# Patient Record
Sex: MALE | Race: WHITE | NOT HISPANIC OR LATINO | URBAN - METROPOLITAN AREA
[De-identification: names, ages, dates, MRNs, and addresses within clinical notes are randomized per-mention and may not be internally consistent; named-entity substitution may affect disease eponyms.]

---

## 2017-01-24 ENCOUNTER — OFFICE VISIT - RIVER FALLS (OUTPATIENT)
Dept: FAMILY MEDICINE | Facility: CLINIC | Age: 82
End: 2017-01-24

## 2017-01-24 ASSESSMENT — MIFFLIN-ST. JEOR: SCORE: 1589.77

## 2017-08-30 ENCOUNTER — OFFICE VISIT - RIVER FALLS (OUTPATIENT)
Dept: FAMILY MEDICINE | Facility: CLINIC | Age: 82
End: 2017-08-30

## 2017-08-30 ASSESSMENT — MIFFLIN-ST. JEOR: SCORE: 1551.67

## 2017-08-31 LAB
CHOLEST SERPL-MCNC: 148 MG/DL
CHOLEST/HDLC SERPL: 2.5 {RATIO}
CREAT SERPL-MCNC: 1.16 MG/DL (ref 0.7–1.11)
GLUCOSE BLD-MCNC: 95 MG/DL (ref 65–99)
HDLC SERPL-MCNC: 59 MG/DL
LDLC SERPL CALC-MCNC: 70 MG/DL
NONHDLC SERPL-MCNC: 89 MG/DL
TRIGL SERPL-MCNC: 102 MG/DL

## 2018-01-01 ENCOUNTER — OFFICE VISIT - RIVER FALLS (OUTPATIENT)
Dept: FAMILY MEDICINE | Facility: CLINIC | Age: 83
End: 2018-01-01

## 2018-01-01 ENCOUNTER — COMMUNICATION - RIVER FALLS (OUTPATIENT)
Dept: FAMILY MEDICINE | Facility: CLINIC | Age: 83
End: 2018-01-01

## 2018-01-01 ASSESSMENT — MIFFLIN-ST. JEOR: SCORE: 1540.78

## 2018-02-08 ENCOUNTER — OFFICE VISIT - RIVER FALLS (OUTPATIENT)
Dept: FAMILY MEDICINE | Facility: CLINIC | Age: 83
End: 2018-02-08

## 2018-02-09 LAB
CREAT SERPL-MCNC: 1.51 MG/DL (ref 0.7–1.11)
GLUCOSE BLD-MCNC: 93 MG/DL (ref 65–99)

## 2018-04-02 ENCOUNTER — AMBULATORY - RIVER FALLS (OUTPATIENT)
Dept: FAMILY MEDICINE | Facility: CLINIC | Age: 83
End: 2018-04-02

## 2018-04-03 LAB
CHOLEST SERPL-MCNC: 131 MG/DL
CHOLEST/HDLC SERPL: 2.1 {RATIO}
CREAT SERPL-MCNC: 1.4 MG/DL (ref 0.7–1.11)
GLUCOSE BLD-MCNC: 76 MG/DL (ref 65–99)
HDLC SERPL-MCNC: 61 MG/DL
LDLC SERPL CALC-MCNC: 56 MG/DL
NONHDLC SERPL-MCNC: 70 MG/DL
PSA SERPL-MCNC: 0.3 NG/ML
TRIGL SERPL-MCNC: 65 MG/DL

## 2019-01-01 ENCOUNTER — OFFICE VISIT - RIVER FALLS (OUTPATIENT)
Dept: FAMILY MEDICINE | Facility: CLINIC | Age: 84
End: 2019-01-01

## 2019-01-01 LAB
CREAT SERPL-MCNC: 1.13 MG/DL
CREAT SERPL-MCNC: 1.24 MG/DL
HGB BLD-MCNC: 10.3 G/DL
HGB BLD-MCNC: 10.9 G/DL
POTASSIUM BLD-SCNC: 3.7 MEQ/L
POTASSIUM BLD-SCNC: 4 MEQ/L
SODIUM SERPL-SCNC: 140 MEQ/L
SODIUM SERPL-SCNC: 141 MEQ/L
WBC # BLD AUTO: 6.6 X10
WBC # BLD AUTO: 7 X10

## 2022-02-12 VITALS
BODY MASS INDEX: 29.59 KG/M2 | HEIGHT: 69 IN | SYSTOLIC BLOOD PRESSURE: 144 MMHG | WEIGHT: 199.8 LBS | HEART RATE: 64 BPM | DIASTOLIC BLOOD PRESSURE: 76 MMHG

## 2022-02-12 VITALS
WEIGHT: 197.4 LBS | SYSTOLIC BLOOD PRESSURE: 158 MMHG | HEIGHT: 69 IN | BODY MASS INDEX: 29.24 KG/M2 | HEART RATE: 59 BPM | DIASTOLIC BLOOD PRESSURE: 62 MMHG | OXYGEN SATURATION: 97 %

## 2022-02-12 VITALS
OXYGEN SATURATION: 98 % | DIASTOLIC BLOOD PRESSURE: 62 MMHG | HEART RATE: 56 BPM | SYSTOLIC BLOOD PRESSURE: 138 MMHG | TEMPERATURE: 97.9 F

## 2022-02-12 VITALS
DIASTOLIC BLOOD PRESSURE: 62 MMHG | SYSTOLIC BLOOD PRESSURE: 124 MMHG | BODY MASS INDEX: 30.84 KG/M2 | WEIGHT: 208.2 LBS | HEIGHT: 69 IN | HEART RATE: 58 BPM

## 2022-02-15 NOTE — PROGRESS NOTES
GABBY WATERMAN  : 1931  10/10/2018  MRN: [id number]  [facility] Visit  The patient has three issues.  PROBLEM #1  S: Severe anxiety.  He has been on alprazolam t.i.d. for quite some time.  Recently sertraline 25 mg daily was added.  This seems to have been partially effective and staff wonders if it can be increased in strength.  The patient reports not significant side effects.  P: We will increase the sertraline to 50 mg daily.  PROBLEM #2  S: Arthritis.  A couple of weeks ago we started him on some tramadol for his arthritis and this has been very effective in decreasing his pain.  However, he has noticed a change in his stool habits, which is quite disturbing since he has a colostomy bag and his colostomy irrigation habits have changed significantly since he started the tramadol.  He wants to discontinue the tramadol in hopes that his bowel habits will return to previous.  P: His tramadol is discontinued and in place of it I am going to try Celebrex 100 mg daily.  He does have a slightly elevated creatinine so we will need to be cognizant of that and monitor that.  PROBLEM #3  S: Colostomy.  His is concerned that he is having to irrigate every day and it takes two to three hours every day to achieve this.  If his bowel habits don t return to normal with the medication changes we may need to consider an alternate method of managing his colostomy.  A colostomy specialist might be consulted for this purpose if it becomes necessary.  P: I will be following up with the patient again next week to find out what affect our changes have had.  D: 10/10/2018  T: 10/11/2018  Jay Jay Vasques MD/ann

## 2022-02-15 NOTE — TELEPHONE ENCOUNTER
---------------------  From: Wendy De La Torre CMA   To: Kerri Poe;     Sent: 2019 9:39:12 AM CST  Subject: alprazolam refill     Pt at [facility] that [facility] sees when he does [facility] rounds    PCP:   [facility]    Medication:   alprazolam not currently listed on his med list but I do know that it is listed on his recent hospital discharge dated 19 as a new dose  Last Filled:  _    Quantity: 60    Refills:  _---------------------  From: Kerri Poe   To: Wendy De La Torre CMA;     Sent: 2019 10:51:47 AM CST  Subject: RE: alprazolam refill     I sent a small tapering prescription to  after reviewing the epdmp which indicated a 5 day supply was given 2019 by DR Behzad Garcia.  If this is a routine med, Dr Vasques can refill for longer.

## 2022-02-15 NOTE — PROGRESS NOTES
GABBY WATERMANWyatt : 1931 MRN: [id number]  [facility] Visit  S: The resident has a current urinary tract infection as evidenced by urinalysis.  His anxiety seems worse.  His cognitive function seems to be declining.    O: Blood pressure is 162/72.  Pulse is 80.   Weight is 183.     He is mildly confused but alert and cooperative.  Skin is normal color, temperature, and non-diaphoretic.  Lungs are clear.  Heart is regular rate and rhythm without murmur.  No peripheral cyanosis or edema.  A: 1.  Urinary tract infection.    2.  Increased anxiety.   P: Cipro 250 mg b.i.d. for 7 days.  His Ativan is currently p.r.n. and we are going to schedule it t.i.d.   D:  2019  T:  2019 Jay Jay Vasques MD/kinga    c:  [facility]

## 2022-02-15 NOTE — CARE COORDINATION
Pt appears on [facility] chronic disease panel as out of parameters for HTN.  Pt is in  care center and provider seeing on rounds, pt has dementia and will be in care center permanently.  No further contact will be made.

## 2022-02-15 NOTE — PROGRESS NOTES
RADHAGABBY VIRKWyatt :   1931 MRN:   [id number]  [facility] Visit  S: The resident and his family had questioned whether his Noriega catheter needed to come out.  I reviewed the notes from his recent hospitalization and it was recommended that the catheter remain in place until he sees Urology.  He is not having any significant problems with the catheter other than the discomfort associated with prolonged catheterization.    A: 1.  Urinary tract infection.    2.  Urinary retention.     3.  Benign prostatic hypertrophy.    P: The catheter will remain in place.  He is in the process of being scheduled to see Urology ini the near future.     D:  2019  T:  2019 Jay Jay Vasques MD/kinga    c:  [facility]

## 2022-02-15 NOTE — PROGRESS NOTES
COLUMBA GABBY THADDEUS : 1931  [facility] Visit MRN: [id number]  2018  S:  The patient asked me to come out and see him regarding his colostomy.  His previous method of dealing with his colostomy was to irrigate every other evening.  He would now like to switch to a method where he has a larger bag and does not need to irrigate.  He has already tried the larger bag but thinks his stool is too thick to work adequately.  Our plan is to put him on MiraLAX 17 grams daily or b.i.d. to loosen his stool or possibly even liquify his stool so he can use the new bag.     I did call in a prescription for the new bags to [facility] in [place] at [phone].  His new bag is called the Credport 8331 with 38 millimeter opening.  Once he gets the new bags we will get him started on MiraLAX and then will monitor how things go.    A: 1.  History of colon cancer.    2.  History of hemicolectomy.    3.  History of colostomy.   P: As above.    D:  2018  T:  2018                                                Jay Jay Vasques MD/kinga

## 2022-02-15 NOTE — TELEPHONE ENCOUNTER
---------------------  From: Wendy De La Torre CMA (eRx Pool ([id number]   To: Jay Jay Vasques MD;     Sent: 1/2/2019 8:03:13 AM CST  Subject: FW: Medication Management   Due Date/Time: 12/29/2018 2:26:00 PM CST             ** Patient matched by Wendy De La Torre CMA on 1/2/2019 8:03:03 AM CST **      ------------------------------------------  From: [facility]  To: Jay Jay Vasques MD  Sent: December 28, 2018 2:26:31 PM CST  Subject: Medication Management  Due: December 29, 2018 2:26:31 PM CST    ** On Hold Pending Signature **  Drug: ALPRAZolam (ALPRAZolam 0.25 mg oral tablet)  Take one (1) tablet orally (by mouth) twice daily and as needed --newdose  Quantity: 60 EA       Days Supply: 0         Refills: 0  Substitutions Allowed  Notes from Pharmacy:     Dispensed Drug: ALPRAZolam (ALPRAZolam 0.25 mg oral tablet)  Take one (1) tablet orally (by mouth) twice daily and as needed --newdose  Quantity: 60 EA       Days Supply: 0         Refills: 0  Substitutions Allowed  Notes from Pharmacy:   ------------------------------------------

## 2022-02-15 NOTE — PROGRESS NOTES
Patient:   GABBY WATERMAN            MRN: [id number]            FIN: [id number]               Age:   87 years     Sex:  Male     :  1/3/1931   Associated Diagnoses:   Edema of both ankles   Author:   Jay Jay Vasques MD      Impression and Plan      Diagnosis   Edema of both ankles (ZIM56-KK M25.471)   Course:  Worsening.    Plan:  take Furosemide 20 mg on days when weight is 205 lb. or greater.    Orders     Orders   Charges (Evaluation and Management):  94254 office outpatient visit 15 minutes (Charge) (Order): Quantity: 1, Edema of both ankles.     Orders (Selected)   Outpatient Orders  Order  Basic Metabolic Panel* ([facility]): Specimen Type: Serum, Collection Date: 2018 12:30 PM CST.        Visit Information      Date of Service: 2018 11:05 am  Performing Location: [facility]  Encounter#: [id number]      Primary Care Provider (PCP):  Jay Jay Vasques MD    NPI# [id number]   Visit type:  New symptom.    Accompanied by:  Family member.    Source of history:  Self, Family member.    History limitation:  None.       Chief Complaint   2018 11:32 AM CST    Pt here for increased weakness and not producing a lot of urine        History of Present Illness             The patient presents with peripheral edema.  The location of peripheral edema is bilateral.  The severity of the peripheral edema is mild.  The peripheral edema is episodic.  The peripheral edema has lasted for an unknown duration of time.  There are no modifying factors.  Associated symptoms consist of none.        Review of Systems   Constitutional:  Negative.    Eye:  Negative.    Ear/Nose/Mouth/Throat:  Negative.    Respiratory:  Negative.    Cardiovascular:  Negative.    Gastrointestinal:  Negative.    Genitourinary:  Negative.    Hematology/Lymphatics:  Negative.    Endocrine:  Negative.    Immunologic:  Negative.    Musculoskeletal:  Negative.    Integumentary:  Negative.    Neurologic:  Negative.    Psychiatric:  Negative.           All other systems reviewed and negative      Health Status   Allergies:    Allergic Reactions (Selected)  Severity Not Documented  Azithromycin (Constipation and constipation)  Citalopram (Gi distress)  HydrOXYzine (Alters mental staus)  Oxycodone (Alters mental status)  Penicillin (No reactions were documented)  Nonallergic Reactions (Selected)  Moderate  Sertraline (Dizziness)   Medications:  (Selected)   Prescriptions  Prescribed  ALPRAZolam 0.25 mg oral tablet: See Instructions, Instructions: 1 tab(s) PO QAM and 1 tabs PO QPM, PRN: for anxiety, # 60 tab(s), 5 Refill(s), Type: Maintenance, Pharmacy: [facility], 1 tab(s) PO QAM and 1 tabs PO QPM,PRN:for anxiety  ASSURA EASICLOSE DRAIN POUCH [id number]: ASSURA EASICLOSE DRAIN POUCH 87684, See Instructions, Instructions: AS DIRECTED, Supply, # 20 EA, 11 Refill(s), Type: Maintenance  Assura Easiclose Drain pouch (93148): Assura Easiclose Drain pouch (79710), See Instructions, Instructions: as directed, Supply, # 1 box(es), 5 Refill(s), Type: Maintenance, Pharmacy: [facility], as directed  C-pap mask, tubing and supplies: C-pap mask, tubing and supplies, See Instructions, Instructions: Please supply pt with  C-pap machine and  supplies such as mask, tubing, filters from [facility], Supply, # 1 EA, 0 Refill(s), Type: Maintenance  Irrigation Sleeve #7728: Irrigation Sleeve #7728, See Instructions, Instructions: as directed, Supply, # 20 EA, 5 Refill(s), Type: Maintenance, called to pharmacy (Rx)  amLODIPine 5 mg oral tablet: 1 tab(s) ( 5 mg ), po, daily, # 30 tab(s), 5 Refill(s), Type: Maintenance, Pharmacy: [facility], 1 tab(s) po daily  aspirin 81 mg oral tablet: 1 tab(s) ( 81 mg ), po, daily, # 30 tab(s), 0 Refill(s), Type: Maintenance  furosemide 20 mg oral tablet: 1 tab(s) ( 20 mg ), PO, Daily, PRN: Other (see comment), # 15 tab(s), 0 Refill(s), Type: Maintenance, Pharmacy: [facility], 1 tab(s) po daily,PRN:Other  (see comment)  omeprazole 20 mg oral delayed release capsule: 1 cap(s) ( 20 mg ), po, daily, # 30 cap(s), 5 Refill(s), Type: Maintenance, Pharmacy: [facility], Pt due for appt for refills, 1 cap(s) po daily  simvastatin 20 mg oral tablet: 1 tab(s) ( 20 mg ), po, hs, # 30 tab(s), 5 Refill(s), Type: Maintenance, Pharmacy: [facility], 1 tab(s) po hs  Documented Medications  Documented  Glucosamine & Chondroitin with MSM: po, tid, tab(s), 0 Refill(s), Type: Maintenance  PreserVision oral tablet: 1 tab(s), po, daily, 0 Refill(s), Type: Maintenance  loratadine 10 mg oral tablet: 1 tab(s) ( 10 mg ), po, daily, 0 Refill(s), Type: Maintenance   Problem list:    All Problems (Selected)  Allergic Rhinitis / ICD-9-.9 / Confirmed  Anxiety / SNOMED CT 91530169 / Confirmed  BPH (Benign Prostatic Hypertrophy) / ICD-9-.00 / Confirmed  Cancer of colon / SNOMED CT 558061624 / Confirmed  Colostomy / ICD-9-CM 46.1 / Confirmed  Depression, major, recurrent, moderate / ICD-9-.32 / Confirmed  KEVIN (generalized anxiety disorder) / SNOMED CT 51340090 / Confirmed  GERD (Gastroesophageal Reflux Disease) / ICD-9-.81 / Confirmed  Hiatal hernia / SNOMED CT 886247140 / Confirmed  High Cholesterol / ICD-9-.0 / Confirmed  Hypertension / SNOMED CT 8023229063 / Confirmed  Obesity / ICD-9-.00 / Confirmed  LUDMILA (Obstructive Sleep Apnea) / ICD-9-.23 / Confirmed  LUDMILA (obstructive sleep apnea) / SNOMED CT 591155126 / Confirmed  Osteoarthritis of Left Knee / ICD-9-.96 / Confirmed  Overactive bladder / SNOMED CT 008847452 / Confirmed      Histories   Past Medical History:    Active  Cancer of colon (408402763)  Hypertension (3560997275)  BPH (Benign Prostatic Hypertrophy) (600.00)  LUDMILA (Obstructive Sleep Apnea) (327.23)  Overactive bladder (370597489)  Colostomy (46.1)  Allergic Rhinitis (477.9)  Obesity (278.00)  Hiatal hernia (579356541)  High Cholesterol (272.0)  Resolved  *Hospitalized@[facility] -  BBPV: Onset on 10/12/2014 at 83 years.  Resolved on 10/15/2014 at 83 years.  *Hospitalized@[facility] - Urinary tract infection: Onset on 2014 at 83 years.  Resolved on 2014 at 83 years.  *Hospitalized@[facility] - Uretheral obstruction & UTI: Onset on 2013 at 82 years.  Resolved on 2013 at 82 years.   Family History:    Cancer  Sister ()  Hypertension  Mother ()     Procedure history:    Transurethral resection of prostate (SNOMED CT 073322226) in  at 83 Years.  Comments:  7/15/2014 11:01 AM - Jay Jay Vasques MD, Dr.  [facility]  Esophagogastroduodenoscopy (SNOMED CT 076614745) performed by Micheal Garza MD on 5/15/2012 at 81 Years.  Comments:  2/15/2013 2:54 PM - Katie Crum RN  Duodenal bx;  active chronic nonspecific duodenitis; no histologic evidence of celiac disease.  Stomach bx;  normal fundic mucosa, no evidence of gastritis, intestinal metaplasia or atrophy.  Colonoscopy (SNOMED CT 206106459) performed by Demetrio Bartlett MD on 3/28/2011 at 80 Years.  Comments:  5/3/2012 10:21 AM - Eleazar Fournier MA  Normal colonoscopy, follow up prn  Colonoscopy (SNOMED CT 595558215) performed by Demetrio Bartlett MD on 3/14/2006 at 75 Years.  Comments:  2010 3:48 PM - Khan , Kim  Polypectomy  Blepharoplasty (SNOMED CT 946102635) in  at 73 Years.  Colonoscopy (SNOMED CT 535963149) in the month of 2001 at 70 Years.  Arthroscopy of shoulder (SNOMED CT 932206469) in the month of 2000 at 69 Years.  Comments:  2010 3:47 PM - Khan , Kim  Left  Cataract extraction (SNOMED CT 94060795) in the month of 1999 at 68 Years.  Comments:  2010 3:46 PM - Khan , Kim  os  Cataract extraction (SNOMED CT 01098674) in the month of 10/1999 at 68 Years.  Comments:  2010 3:46 PM - Khan , Kim  OD  Arthroscopy of knee (SNOMED CT 902247026) in  at 63 Years.  Comments:  2010 3:47 PM - Kim Khan  Right  partial colectomy with colostomy placement in  at 55 Years.    Social History:        Alcohol Assessment: Past            Past      Tobacco Assessment: Past            Past                     Comments:                      02/21/2013 - Nanci Ramirez                     Used to smoke cigarettes and cigars      Substance Abuse Assessment            Never      Employment and Education Assessment            Retired      Home and Environment Assessment            3 children.            Marital status: .  Lives with Self.  Living situation: Home/Independent.      Nutrition and Health Assessment            Type of diet: Regular.      Exercise and Physical Activity Assessment: Does not exercise        Physical Examination   Vital Signs   2/8/2018 11:32 AM CST Temperature Tympanic 97.9 DegF    Peripheral Pulse Rate 56 bpm  LOW    Systolic Blood Pressure 138 mmHg  HI    Diastolic Blood Pressure 62 mmHg    Mean Arterial Pressure 87 mmHg    BP Site Left arm    Oxygen Saturation 98 %      Measurements from flowsheet : Measurements   2/8/2018 11:32 AM CST    Ht/Wt Measurement Refused by Patient?     Yes     General:  No acute distress.    Neck:  Supple, No lymphadenopathy, No thyromegaly.    Respiratory:  Lungs are clear to auscultation, Respirations are non-labored, Breath sounds are equal, Symmetrical chest wall expansion.    Cardiovascular:  Normal rate, Regular rhythm, No murmur, No gallop, Good pulses equal in all extremities, Normal peripheral perfusion, trace bilateral ankle edema.    Gastrointestinal:  Soft, Non-tender, Non-distended, Normal bowel sounds, No organomegaly.    Integumentary:  Warm, Dry, Pink.    Neurologic:  Alert, Oriented.    Psychiatric:  Cooperative, Appropriate mood & affect.

## 2022-02-15 NOTE — PROGRESS NOTES
Patient:   GABBY WATERMAN            MRN: [id number]            FIN: [id number]               Age:   86 years     Sex:  Male     :  1/3/1931   Associated Diagnoses:   Hypertension; KEVIN (generalized anxiety disorder); High Cholesterol; LUDMILA (obstructive sleep apnea)   Author:   Jay Jay Vasques MD      Impression and Plan   Diagnosis     Hypertension (GHG76-WO I10).     Course:  Progressing as expected, Well controlled.    Orders     Orders   Charges (Evaluation and Management):  35468 office outpatient visit 25 minutes (Charge) (Order): Quantity: 1, Hypertension  High Cholesterol  LUDMILA (obstructive sleep apnea).     Orders (Selected)   Prescriptions  Prescribed  amLODIPine 5 mg oral tablet: 1 tab(s) ( 5 mg ), po, daily, # 30 tab(s), 5 Refill(s), Type: Maintenance, Pharmacy: [facility], 1 tab(s) po daily.     Diagnosis     KEVIN (generalized anxiety disorder) (PHC09-XU F41.1).     Course:  Progressing as expected.    Orders     Orders (Selected)   Prescriptions  Prescribed  ALPRAZolam 0.25 mg oral tablet: See Instructions, Instructions: 1 tab(s) PO QAM and 1 tabs PO QPM, PRN: for anxiety, # 60 tab(s), 5 Refill(s), Type: Maintenance, Pharmacy: [facility], 1 tab(s) PO QAM and 1 tabs PO QPM,PRN:for anxiety.     Diagnosis     High Cholesterol (ZKP45-NH E78.0).     Course:  Progressing as expected.    Orders     Orders (Selected)   Prescriptions  Prescribed  simvastatin 20 mg oral tablet: 1 tab(s) ( 20 mg ), po, hs, # 30 tab(s), 5 Refill(s), Type: Maintenance, Pharmacy: [facility], 1 tab(s) po hs.     Diagnosis     LUDMILA (obstructive sleep apnea) (RFE22-OZ G47.33).     Course:  Worsening.    Orders     Orders (Selected)   Outpatient Orders  Order  Home Sleep Study (Request): LUDMILA (obstructive sleep apnea).     Plan:  Patient is using and benefiting from CPAP on a daily basis.  His equipment has been broken for one month and needs replacement.  Patient has been diagnosed with Obstructive Sleep Apnea by  abnormal Polysomnogram results several years ago.  The CPAP controlls the problem and is considered essential to the patient's well being.  The patient needs to update his polysomnogram test prior to replacing his CPAP unit.  The Polysomnogram has been ordered and the patient's family will assist him with transport to the testing facility (he currently resides in an assisted care facility)..       Visit Information      Date of Service: 08/30/2017 02:00 pm  Performing Location: [facility]  Encounter#: [id number]      Primary Care Provider (PCP):  Jay Jay Vasques MD# [id number]      Referring Provider:  Jay Jay Vasques MD# [id number]   Visit type:  Scheduled follow-up.    Accompanied by:  Family member.    Source of history:  Self, Family member.    Referral source:  Self.    History limitation:  None.       Chief Complaint   8/30/2017 2:21 PM CDT    Pt here to discuss need for new cpap machine      History of Present Illness             The patient presents for follow-up evaluation of hypertension.  The quality of hypertension symptom(s) since the patient's last visit is described as being unchanged.  The severity of the hypertension symptom(s) since the last visit is moderate.  Since the patient's last visit, the timing/course of hypertension symptom(s) is constant.  Exacerbating factors consist of none.  Relieving factors consist of medication.  Associated symptoms consist of none.  Prior treatment consists of lifestyle modification (weight reduction, dietary sodium restriction, increased physical activity, adoption of DASH eating plan).  Medical encounters: none.  Compliance problems: none.               The patient presents with anxiety.  The anxiety is characterized by difficulty concentrating, irritability, nervousness and restlessness.  The severity of the anxiety is moderate.  The anxiety is constant.  The context of the anxiety: occurred in association with the inability to cope  with stress.  Exacerbating factors consist of emotional stress and social change.  Relieving factors consist of medication and stress reduction.        Interval History   Cholesterol Management   Total cholesterol results < 200 mg/dL (optimal).  LDL cholesterol results < 100 mg/dL (optimal).  Triglyceride results < 150 mg/dL (normal).  The course is progressing as expected.  The effect on daily activities is no change in activity level and no change in eating habits.  Associated symptoms characterized by no fatigue, chest pain, joint pain, muscle weakness or myalgias.        Review of Systems   Constitutional:  Negative.    Eye:  Negative.    Ear/Nose/Mouth/Throat:  Negative.    Respiratory:  Negative.    Cardiovascular:  Negative.    Gastrointestinal:  Negative.    Genitourinary:  Negative.    Hematology/Lymphatics:  Negative.    Endocrine:  Negative.    Immunologic:  Negative.    Musculoskeletal:  Back pain.    Integumentary:  Negative.    Neurologic:  Negative.    Psychiatric:  Negative.    All other systems reviewed and negative      Health Status   Allergies:    Allergic Reactions (Selected)  Severity Not Documented  Azithromycin (Constipation and constipation)  Citalopram (Gi distress)  HydrOXYzine (Alters mental staus)  Oxycodone (Alters mental status)  Penicillin (No reactions were documented)  Nonallergic Reactions (Selected)  Moderate  Sertraline (Dizziness)   Medications:  (Selected)   Prescriptions  Prescribed  ALPRAZolam 0.25 mg oral tablet: See Instructions, Instructions: 1 tab(s) PO QAM and 1 tabs PO QPM, PRN: for anxiety, # 60 tab(s), 5 Refill(s), Type: Maintenance, Pharmacy: [facility], 1 tab(s) PO QAM and 1 tabs PO QPM,PRN:for anxiety  ASSURA EASICLOSE DRAIN POUCH 42571: ASSURA EASICLOSE DRAIN POUCH 52590, See Instructions, Instructions: AS DIRECTED, Supply, # 20 EA, 11 Refill(s), Type: Maintenance  Assura Easiclose Drain pouch (60354): Assura Easiclose Drain pouch (28275), See  Instructions, Instructions: as directed, Supply, # 1 box(es), 5 Refill(s), Type: Maintenance, Pharmacy: [facility], as directed  C-pap mask, tubing and supplies: C-pap mask, tubing and supplies, See Instructions, Instructions: Please supply pt with necessary C-pap supplies, Supply, # 1 EA, 0 Refill(s), Type: Maintenance  Irrigation Sleeve #7728: Irrigation Sleeve #7728, See Instructions, Instructions: as directed, Supply, # 20 EA, 5 Refill(s), Type: Maintenance, called to pharmacy (Rx)  amLODIPine 5 mg oral tablet: 1 tab(s) ( 5 mg ), po, daily, # 30 tab(s), 5 Refill(s), Type: Maintenance, Pharmacy: [facility], 1 tab(s) po daily  aspirin 81 mg oral tablet: 1 tab(s) ( 81 mg ), po, daily, # 30 tab(s), 0 Refill(s), Type: Maintenance  omeprazole 20 mg oral delayed release capsule: 1 cap(s) ( 20 mg ), po, daily, # 30 cap(s), 5 Refill(s), Type: Maintenance, Pharmacy: [facility], Pt due for appt for refills, 1 cap(s) po daily  simvastatin 20 mg oral tablet: 1 tab(s) ( 20 mg ), po, hs, # 30 tab(s), 5 Refill(s), Type: Maintenance, Pharmacy: [facility], 1 tab(s) po hs  Documented Medications  Documented  Glucosamine & Chondroitin with MSM: po, tid, tab(s), 0 Refill(s), Type: Maintenance  PreserVision oral tablet: 1 tab(s), po, daily, 0 Refill(s), Type: Maintenance  loratadine 10 mg oral tablet: 1 tab(s) ( 10 mg ), po, daily, 0 Refill(s), Type: Maintenance   Problem list:    All Problems  Allergic Rhinitis / ICD-9-.9 / Confirmed  Anxiety / SNOMED CT 01946640 / Confirmed  BPH (Benign Prostatic Hypertrophy) / ICD-9-.00 / Confirmed  Cancer of colon / SNOMED CT 337063837 / Confirmed  Colostomy / ICD-9-CM 46.1 / Confirmed  Depression, major, recurrent, moderate / ICD-9-.32 / Confirmed  GERD (Gastroesophageal Reflux Disease) / ICD-9-.81 / Confirmed  Hiatal hernia / SNOMED CT 032723804 / Confirmed  High Cholesterol / ICD-9-.0 / Confirmed  Hypertension / SNOMED CT  4180542529 / Confirmed  Obesity / ICD-9-.00 / Confirmed  LUDMILA (Obstructive Sleep Apnea) / ICD-9-.23 / Confirmed  Osteoarthritis of Left Knee / ICD-9-.96 / Confirmed  Overactive bladder / SNOMED CT 444059992 / Confirmed  Inactive: Migraine / ICD-9-.90  Inactive: Osteoarthritis of Knee / ICD-9-.96  Resolved: *Hospitalized@[facility] - BBPV  Resolved: *Hospitalized@[facility] - Uretheral obstruction & UTI  Resolved: *Hospitalized@[facility] - Urinary tract infection  Canceled: Hypercholesteremia / ICD-9-.0      Histories   Past Medical History:    Active  Cancer of colon (929734921)  Hypertension (5401228869)  BPH (Benign Prostatic Hypertrophy) (600.00)  LUDMILA (Obstructive Sleep Apnea) (327.23)  Overactive bladder (684572376)  Colostomy (46.1)  Allergic Rhinitis (477.9)  Obesity (278.00)  Hiatal hernia (005051590)  High Cholesterol (272.0)  Resolved  *Hospitalized@[facility] - BBPV: Onset on 10/12/2014 at 83 years.  Resolved on 10/15/2014 at 83 years.  *Hospitalized@[facility] - Urinary tract infection: Onset on 2014 at 83 years.  Resolved on 2014 at 83 years.  *Hospitalized@[facility] - Uretheral obstruction & UTI: Onset on 2013 at 82 years.  Resolved on 2013 at 82 years.   Family History:    Cancer  Sister ()  Hypertension  Mother ()     Procedure history:    Transurethral resection of prostate (SNOMED CT 931516393) in  at 83 Years.  Comments:  7/15/2014 11:01 AM - Jay Jay Vasques MD, Dr.  [facility]  Esophagogastroduodenoscopy (SNOMED CT 775503032) performed by Micheal Garza MD on 5/15/2012 at 81 Years.  Comments:  2/15/2013 2:54 PM - Katie Crum RN  Duodenal bx;  active chronic nonspecific duodenitis; no histologic evidence of celiac disease.  Stomach bx;  normal fundic mucosa, no evidence of gastritis, intestinal metaplasia or atrophy.  Colonoscopy (SNOMED CT 137683888) performed by Demetrio Bartlett MD on 3/28/2011 at 80 Years.  Comments:  5/3/2012 10:21 AM - Shantanu RENE,  Eleazar  Normal colonoscopy, follow up prn  Colonoscopy (SNOMED CT 223326699) performed by Demetrio Bartlett MD on 3/14/2006 at 75 Years.  Comments:  4/2/2010 3:48 PM - Khan , Kim  Polypectomy  Blepharoplasty (SNOMED CT 499785809) in 2004 at 73 Years.  Colonoscopy (SNOMED CT 772770604) in the month of 4/2001 at 70 Years.  Arthroscopy of shoulder (SNOMED CT 809882581) in the month of 8/2000 at 69 Years.  Comments:  4/2/2010 3:47 PM - Khan , Kim  Left  Cataract extraction (SNOMED CT 39421382) in the month of 11/1999 at 68 Years.  Comments:  4/2/2010 3:46 PM - Khan , Kim  os  Cataract extraction (SNOMED CT 91195436) in the month of 10/1999 at 68 Years.  Comments:  4/2/2010 3:46 PM - Khan , Kim  OD  Arthroscopy of knee (SNOMED CT 068953379) in 1994 at 63 Years.  Comments:  4/2/2010 3:47 PM - Hkan , Kim  Right  partial colectomy with colostomy placement in 1986 at 55 Years.   Social History:        Alcohol Assessment: Past            Past      Tobacco Assessment: Past            Past                     Comments:                      02/21/2013 - Samarashanti KiahNanci                     Used to smoke cigarettes and cigars      Substance Abuse Assessment            Never      Employment and Education Assessment            Retired      Home and Environment Assessment            3 children.            Marital status: .  Lives with Self.  Living situation: Home/Independent.      Nutrition and Health Assessment            Type of diet: Regular.      Exercise and Physical Activity Assessment: Does not exercise        Physical Examination   Vital Signs   8/30/2017 3:02 PM CDT Systolic Blood Pressure 138 mmHg   8/30/2017 2:21 PM CDT Peripheral Pulse Rate 64 bpm    Systolic Blood Pressure 144 mmHg  HI    Diastolic Blood Pressure 76 mmHg    Mean Arterial Pressure 99 mmHg      Measurements from flowsheet : Measurements   8/30/2017 2:21 PM CDT Height Measured - Standard 69 in    Weight Measured - Standard 199.8 lb     BSA 2.1 m2    Body Mass Index 29.5 kg/m2      General:  No acute distress.    Neck:  Supple, No lymphadenopathy, No thyromegaly.    Respiratory:  Lungs are clear to auscultation, Respirations are non-labored, Breath sounds are equal, Symmetrical chest wall expansion.    Cardiovascular:  Normal rate, Regular rhythm, No murmur, No gallop, Good pulses equal in all extremities, Normal peripheral perfusion, No edema.    Gastrointestinal:  Soft, Non-tender, Non-distended, Normal bowel sounds, No organomegaly.    Integumentary:  Warm, Dry, Pink.    Neurologic:  Alert, Oriented.    Psychiatric:  Cooperative, Appropriate mood & affect.

## 2022-02-15 NOTE — PROGRESS NOTES
Patient:   GABBY WATERMAN            MRN: [id number]            FIN: [id number]               Age:   86 years     Sex:  Male     :  1/3/1931   Associated Diagnoses:   Hypertension; Anxiety; High Cholesterol; GERD (Gastroesophageal Reflux Disease); Left shoulder pain   Author:   Layo BARAHONA, Jay Jay      Impression and Plan   Diagnosis     Hypertension (RKY23-BI I10).     Course:  Well controlled.    Orders     Orders   Charges (Evaluation and Management):  34570 office outpatient visit 25 minutes (Charge) (Order): Quantity: 1, Hypertension  Anxiety  GERD (Gastroesophageal Reflux Disease)  High Cholesterol.     Orders (Selected)   Prescriptions  Prescribed  amLODIPine 5 mg oral tablet: 1 tab(s) ( 5 mg ), po, daily, # 30 tab(s), 5 Refill(s), Type: Maintenance, Pharmacy: [facility], 1 tab(s) po daily.     Diagnosis     Anxiety (ZOP94-IF F41.1).     Course:  Progressing as expected.    Orders     Orders (Selected)   Prescriptions  Prescribed  ALPRAZolam 0.25 mg oral tablet: See Instructions, Instructions: 1 tab(s) PO QAM and 1 tabs PO QPM, PRN: for anxiety, # 60 tab(s), 5 Refill(s), Type: Maintenance, Pharmacy: [facility], 1 tab(s) PO QAM and 1 tabs PO QPM,PRN:for anxiety.     Diagnosis     High Cholesterol (FWH01-LK E78.0).     Course:  Progressing as expected.    Orders     Orders (Selected)   Prescriptions  Prescribed  simvastatin 20 mg oral tablet: 1 tab(s) ( 20 mg ), po, hs, # 30 tab(s), 5 Refill(s), Type: Maintenance, Pharmacy: [facility], 1 tab(s) po hs.     Diagnosis     GERD (Gastroesophageal Reflux Disease) (TWZ55-NN K21.9).     Course:  Progressing as expected.    Orders     Orders (Selected)   Prescriptions  Prescribe  omeprazole 20 mg oral delayed release tablet: 1 tab(s) ( 20 mg ), po, daily, # 30 tab(s), 5 Refill(s), Type: Maintenance, Pharmacy: [facility], 1 tab(s) po daily.     Diagnosis     Left shoulder pain (GYS74-PH M25.512).     Course:  Worsening.    Orders      Orders (Selected)   Outpatient Orders  Order  Referral (Request): 1/24/2017 2:49 PM CST, Referred to: Physical Therapy, Referred to: [facility], Left shoulder pain.        Visit Information      Date of Service: 01/24/2017 01:51 pm  Performing Location: [facility]  Encounter#: [id number]      Primary Care Provider (PCP):  Jay Jay Vasques MD    NPI# [id number]      Referring Provider:  No referring provider recorded for selected visit.   Visit type:  Scheduled follow-up.    Accompanied by:  Family member.    Source of history:  Self, Family member.    Referral source:  Self.    History limitation:  None.       Chief Complaint   1/24/2017 1:57 PM CST    Pt here for med ck and ingrown toenail on right foot        History of Present Illness             The patient presents for follow-up evaluation of hypertension.  The quality of hypertension symptom(s) since the patient's last visit is described as being unchanged.  The severity of the hypertension symptom(s) since the last visit is moderate.  Since the patient's last visit, the timing/course of hypertension symptom(s) is constant.  Exacerbating factors consist of none.  Relieving factors consist of medication.  Associated symptoms consist of none.  Prior treatment consists of lifestyle modification (weight reduction, dietary sodium restriction, increased physical activity, adoption of DASH eating plan).  Medical encounters: none.  Compliance problems: none.               The patient presents with anxiety.  The anxiety is characterized by difficulty concentrating, irritability, nervousness and restlessness.  The severity of the anxiety is moderate.  The anxiety is constant.  The context of the anxiety: occurred in association with the inability to cope with stress.  Exacerbating factors consist of emotional stress and social change.  Relieving factors consist of medication and stress reduction.        Interval  History   Cholesterol Management   Total cholesterol results < 200 mg/dL (optimal).  LDL cholesterol results < 100 mg/dL (optimal).  Triglyceride results < 150 mg/dL (normal).  The course is progressing as expected.  The effect on daily activities is no change in activity level and no change in eating habits.  Associated symptoms characterized by no fatigue, chest pain, joint pain, muscle weakness or myalgias.        Review of Systems   Constitutional:  Negative.    Eye:  Negative.    Ear/Nose/Mouth/Throat:  Negative.    Respiratory:  Negative.    Cardiovascular:  Negative.    Gastrointestinal:  Negative.    Genitourinary:  Negative.    Hematology/Lymphatics:  Negative.    Endocrine:  Negative.    Immunologic:  Negative.    Musculoskeletal:  Back pain.    Integumentary:  Negative.    Neurologic:  Negative.    Psychiatric:  Negative.    All other systems reviewed and negative      Health Status   Allergies:    Allergic Reactions (Selected)  Severity Not Documented  Azithromycin (Constipation and constipation)  Citalopram (Gi distress)  HydrOXYzine (Alters mental staus)  Oxycodone (Alters mental status)  Penicillin (No reactions were documented)  Nonallergic Reactions (Selected)  Moderate  Sertraline (Dizziness)   Medications:  (Selected)   Prescriptions  Prescribed  ALPRAZolam 0.25 mg oral tablet: See Instructions, Instructions: 1 tab(s) PO QAM and 1 tabs PO QPM, PRN: for anxiety, # 60 tab(s), 5 Refill(s), Type: Maintenance, Pharmacy: [facility], 1 tab(s) PO QAM and 1 tabs PO QPM,PRN:for anxiety  ASSURA EASICLOSE DRAIN POUCH 15883: ASSURA EASICLOSE DRAIN POUCH 90247, See Instructions, Instructions: AS DIRECTED, Supply, # 20 EA, 11 Refill(s), Type: Maintenance  Assura Easiclose Drain pouch (15410): Assura Easiclose Drain pouch (51941), See Instructions, Instructions: as directed, Supply, # 20 EA, 5 Refill(s), Type: Maintenance, Pharmacy: [facility], as directed  Irrigation Sleeve: Irrigation Sleeve,  See Instructions, Instructions: as directed, Supply, # 30 EA, 5 Refill(s), Type: Maintenance, Pharmacy: [facility], as directed  amLODIPine 5 mg oral tablet: 1 tab(s) ( 5 mg ), po, daily, # 30 tab(s), 5 Refill(s), Type: Maintenance, Pharmacy: [facility], 1 tab(s) po daily  aspirin 81 mg oral tablet: 1 tab(s) ( 81 mg ), po, daily, # 30 tab(s), 0 Refill(s), Type: Maintenance  simvastatin 20 mg oral tablet: 1 tab(s) ( 20 mg ), po, hs, # 30 tab(s), 5 Refill(s), Type: Maintenance, Pharmacy: [facility], 1 tab(s) po hs  Documented Medications  Documented  Glucosamine & Chondroitin with MSM: po, tid, tab(s), 0 Refill(s), Type: Maintenance  PreserVision oral tablet: 1 tab(s), po, daily, 0 Refill(s), Type: Maintenance  loratadine 10 mg oral tablet: 1 tab(s) ( 10 mg ), po, daily, 0 Refill(s), Type: Maintenance   Problem list:    All Problems  Allergic Rhinitis / ICD-9-.9 / Confirmed  Anxiety / SNOMED CT 41779865 / Confirmed  BPH (Benign Prostatic Hypertrophy) / ICD-9-.00 / Confirmed  Cancer of colon / SNOMED CT 039124859 / Confirmed  Colostomy / ICD-9-CM 46.1 / Confirmed  Depression, major, recurrent, moderate / ICD-9-.32 / Confirmed  GERD (Gastroesophageal Reflux Disease) / ICD-9-.81 / Confirmed  Hiatal hernia / SNOMED CT 669605300 / Confirmed  High Cholesterol / ICD-9-.0 / Confirmed  Hypertension / SNOMED CT 8780651871 / Confirmed  Obesity / ICD-9-.00 / Confirmed  LUDMILA (Obstructive Sleep Apnea) / ICD-9-.23 / Confirmed  Osteoarthritis of Left Knee / ICD-9-.96 / Confirmed  Overactive bladder / SNOMED CT 897834027 / Confirmed  Inactive: Migraine / ICD-9-.90  Inactive: Osteoarthritis of Knee / ICD-9-.96  Resolved: *Hospitalized@[facility] - [facility]BP[facility]  Resolved: *Hospitalized@[facility] - Uretheral obstruction & UTI  Resolved: *Hospitalized@[facility] - Urinary tract infection  Canceled: Hypercholesteremia / ICD-9-.0      Histories   Past Medical History:     Active  Cancer of colon (328653490)  Hypertension (3848489031)  BPH (Benign Prostatic Hypertrophy) (600.00)  LUDMILA (Obstructive Sleep Apnea) (327.23)  Overactive bladder (013022128)  Colostomy (46.1)  Allergic Rhinitis (477.9)  Obesity (278.00)  Hiatal hernia (637117738)  High Cholesterol (272.0)  Resolved  *Hospitalized@[facility] - [facility]BP[facility]: Onset on 10/12/2014 at 83 years.  Resolved on 10/15/2014 at 83 years.  *Hospitalized@[facility] - Urinary tract infection: Onset on 2014 at 83 years.  Resolved on 2014 at 83 years.  *Hospitalized@[facility] - Uretheral obstruction & UTI: Onset on 2013 at 82 years.  Resolved on 2013 at 82 years.   Family History:    Cancer  Sister ()  Hypertension  Mother ()     Procedure history:    Transurethral resection of prostate (SNOMED CT 469664015) in  at 83 Years.  Comments:  7/15/2014 11:01 AM - Jay Jay Vasques MD, Dr.  [facility]  Esophagogastroduodenoscopy (SNOMED CT 307273939) performed by Micheal Garza MD on 5/15/2012 at 81 Years.  Comments:  2/15/2013 2:54 PM - Katie Crum RN  Duodenal bx;  active chronic nonspecific duodenitis; no histologic evidence of celiac disease.  Stomach bx;  normal fundic mucosa, no evidence of gastritis, intestinal metaplasia or atrophy.  Colonoscopy (SNOMED CT 859001304) performed by Demetrio Bartlett MD on 3/28/2011 at 80 Years.  Comments:  5/3/2012 10:21 AM - Eleazar Fournier MA  Normal colonoscopy, follow up prn  Colonoscopy (SNOMED CT 171192868) performed by Demetrio Bartlett MD on 3/14/2006 at 75 Years.  Comments:  2010 3:48 PM - Khan , Kim  Polypectomy  Blepharoplasty (SNOMED CT 198393163) in  at 73 Years.  Colonoscopy (SNOMED CT 293639864) in the month of 2001 at 70 Years.  Arthroscopy of shoulder (SNOMED CT 687838085) in the month of 2000 at 69 Years.  Comments:  2010 3:47 PM - Khan , Kim  Left  Cataract extraction (SNOMED CT 26745972) in the month of 1999 at 68 Years.  Comments:  2010 3:46  PM - Khan , Kim  os  Cataract extraction (SNOMED CT 37702654) in the month of 10/1999 at 68 Years.  Comments:  4/2/2010 3:46 PM - Khan , Kim  OD  Arthroscopy of knee (SNOMED CT 260453763) in 1994 at 63 Years.  Comments:  4/2/2010 3:47 PM - Khan , Kim  Right  partial colectomy with colostomy placement in 1986 at 55 Years.   Social History:        Alcohol Assessment: Past            Past      Tobacco Assessment: Past            Past                     Comments:                      02/21/2013 - Nanci Ramirez                     Used to smoke cigarettes and cigars      Substance Abuse Assessment            Never      Employment and Education Assessment            Retired      Home and Environment Assessment            3 children.            Marital status: .  Lives with Self.  Living situation: Home/Independent.      Nutrition and Health Assessment            Type of diet: Regular.      Exercise and Physical Activity Assessment: Does not exercise        Physical Examination   Vital Signs   1/24/2017 1:57 PM CST Peripheral Pulse Rate 58 bpm  LOW    Pulse Site Radial artery    HR Method Manual    Systolic Blood Pressure 124 mmHg    Diastolic Blood Pressure 62 mmHg    Mean Arterial Pressure 83 mmHg    BP Site Left arm    BP Method Manual      Measurements from flowsheet : Measurements   1/24/2017 1:57 PM CST Height Measured - Standard 69 in    Weight Measured - Standard 208.2 lb    BSA 2.14 m2    Body Mass Index 30.74 kg/m2      General:  No acute distress.    Neck:  Supple, No lymphadenopathy, No thyromegaly.    Respiratory:  Lungs are clear to auscultation, Respirations are non-labored, Breath sounds are equal, Symmetrical chest wall expansion.    Cardiovascular:  Normal rate, Regular rhythm, No murmur, No gallop, Good pulses equal in all extremities, Normal peripheral perfusion, No edema.    Gastrointestinal:  Soft, Non-tender, Non-distended, Normal bowel sounds, No organomegaly.    Integumentary:   Warm, Dry, Pink.    Neurologic:  Alert, Oriented.    Psychiatric:  Cooperative, Appropriate mood & affect.

## 2022-02-15 NOTE — PROGRESS NOTES
GABBY WATERMAN   : 1931   MRN: [id number]  [facility] Visit  S: The patient is a recent admission from [facility].  He was admitted there for acute urinary retention and cystitis.  He has developed a profound weakness because of the illness.  He has also developed a worsening of his dementia due to the illness and he now has an in-dwelling Noriega catheter.  The patient s nursing home stay will likely be permanent; although, they are holding his apartment until the end of the month.     PAST PROBLEM LIST  1. Remote history of colon cancer.  2. Hypertension.  3. Benign prostatic hyperplasia.  4. Bladder muscle dysfunction.  5. Colostomy.  6. Allergic rhinitis.  7. Obesity.  8. Hiatal hernia.  9. Gastroesophageal reflux disease.  10. Hyperlipidemia.  11. Generalized osteoarthritis especially of the hips and knees.  12. Chronic anxiety.  13. Obsessive compulsive disorder.  14. Obstructive sleep apnea.  15. Chronic kidney disease.  CURRENT MEDICATIONS     Amlodipine 10 mg daily    Famotidine 20 mg b.i.d.    Sertraline 75 mg daily    Simvastatin 20 mg daily    Aspirin 81 mg daily    Polyethylene glycol 17 grams b.i.d.    Multivitamin    Glucosamine chondroitin    Alprazolam 0.25 mg every six hours p.r.n. anxiety  ALLERGIES:  azithromycin, citalopram, hydroxyzine, oxycodone, and penicillin.  PAST SURGERIES:  Arthroscopy of knee in , arthroscopy of shoulder in , blepharoplasty in , cataract extraction bilateral in , colonoscopy in ,  and , EGD in , partial colectomy in , TUR in .  O: Blood pressure and pulse are within normal limits.  Weight is stable.  The patient is alert and appears comfortable.  Skin is normal color, temperature, and non-diaphoretic.  Pupils equal, round and reactive to light.  Extraocular motion is intact.  No ocular discharge.  Normal respiratory rate and effort.  Lungs are clear.  Heart is regular rate and rhythm without murmur, S3  or S4.  No jugular venous distension or carotid bruits.  Abdomen is soft and nontender.  Colostomy on the left.  Extremities are without cyanosis or edema.  Peripheral pulses are palpable.  The patient has generalized weakness.  Normal sensation throughout.  Mental status: The patient is oriented to person, time and place.  He has trouble with word finding.  He has difficulty with recent memory.  He is unable to perform simple calculations or spelling.  He is unable to recall three items for five minutes.    A/P: Recent history of urinary retention and resulting cystitis.  The cystitis has resolved.  He has a urinary catheter in place.  The illness has resulted in a worsening of the patient s dementia and the development of generalized weakness.  I did sign papers certifying that the patient is unable to make his own medical and financial decisions.  The patient is fine with his children taking over decision making.  This will likely be a permanent admission.  D: 01/09/2019  T: 01/10/2019   Jay Jay Vasques MD/ann  c: [facility]

## 2022-02-15 NOTE — PROGRESS NOTES
RADHAGABBY VIRKWyatt  :  1931  10/23/2018  MRN:  [id number]  [facility] ASSISTED LIVING VISIT   S: The patient continues to have concerns about how his colostomy is functioning. Back when he first got the colostomy over 20-some years ago, the patient elected to wear a small bag and to irrigate every other night.  That method is no longer working very well for him.  He often has looser stool coming into his bag, which has to be evacuated frequently.  We have tried manipulating his medications to see if one of the medications might be causing a change in his stool consistency but that has not been significantly helpful.  The patient would now like to trial a larger colostomy bag, which he would change two or three times a week in lieu of irrigation.  I will contact his supplier and order larger bags.  He will trial them and see how he likes it.  Follow up with me if he has any further problems in this regard.     Meanwhile, we will continue him on the Sertraline 50-mg daily for his anxiety and his Celebrex for his arthritis.  He is also going to be getting physical therapy in the near future to help with his arthritis and his conditioning as well.    D:  10/23/2018  T:  10/24/2018 Jay Jay Vasques MD/malgorzata

## 2022-02-15 NOTE — PROGRESS NOTES
Patient:   GABBY WATERMAN            MRN: [id number]            FIN: [id number]               Age:   87 years     Sex:  Male     :  1/3/1931   Associated Diagnoses:   Osteoarthritis of Knee   Author:   Jay Jay Vasques MD      Impression and Plan   Diagnosis     Osteoarthritis of Knee (KMD41-TZ M17.10).     Orders     Orders (Selected)   Outpatient Orders  Ordered  kenalog injection: 40 mg, Intra-articular, once  Completed   arthrocentesis aspir+/injection major jt/bursa (Charge): Quantity: 1, Osteoarthritis of Knee.        Procedure   Joint aspiration/ injection procedure   Date/ Time:  2018 4:10:00 PM.     Confirmed: patient, procedure, side, site, safety procedures followed.     Performed by: Jay Jay Vasques MD.     Informed consent: signed by patient.     Indication: symptomatic relief.     Location: the left knee.     Preparation and technique: skin prep povidone iodine (Betadine), sterile needle used (size #25 gauge, length 1.5 inch, Medial approach).     Joint injected: with  Triamcinolone (40  mg, 1.0  ml).     Procedure tolerated: well.

## 2022-02-15 NOTE — PROGRESS NOTES
Patient:   GABBY WATERMAN            MRN: [id number]            FIN: [id number]               Age:   87 years     Sex:  Male     :  1/3/1931   Associated Diagnoses:   Osteoarthritis of Knee; KEVIN (generalized anxiety disorder)   Author:   Jay Jay Vasques MD      Impression and Plan   Diagnosis     Osteoarthritis of Knee ([id number]-EO M17.10).     Course:  Worsening.    Plan:    1. Triamcinolone Injection  2. Tylenol Arthritis 2 tabs BID  3. Keep exercising knee  4. Try topical treatments like Capscaicin.    Orders     Orders   Charges (Evaluation and Management):  84504 office outpatient visit 15 minutes (Charge) (Order): Quantity: 1, Osteoarthritis of Knee.     Diagnosis     KEVIN (generalized anxiety disorder) (OLJ66-FB F41.1).     Course:  Worsening.    Orders     Orders (Selected)   Prescriptions  Prescribed  ALPRAZolam 0.25 mg oral tablet: See Instructions, Instructions: Take one (1) tablet orally (by mouth) twice daily and as needed --newdose, # 60 EA, 0 Refill(s), Type: Soft Stop, Pharmacy: [facility], Take one (1) tablet orally (by mouth) twice daily and as needed --newdose.        Visit Information      Date of Service: 2018 03:20 pm  Performing Location: [facility]  Encounter#: [id number]   Visit type:  Scheduled follow-up.    Accompanied by:  Medical personnel.    Source of history:  Self.    Referral source:  Self.    History limitation:  None.       Chief Complaint   2018 3:27 PM CDT     Pt here for anxiety and left knee pain        History of Present Illness             The patient presents with a knee problem.  The location of the knee problem is localized, the left knee.  The knee problem is characterized by aching.  The severity of the knee problem is severe.  The timing/course of symptom(s) associated with the knee problem is constant and is worsening.  The knee problem has lasted for many years.  Radiation of pain: none.  The context of the knee problem: occurred in  association with arthritis.  Exacerbating factors consist of none.  Relieving factors consist of analgesics and medication.  Associated symptoms consist of none.        Review of Systems   Constitutional:  Negative.    Eye:  Negative.    Ear/Nose/Mouth/Throat:  Negative.    Respiratory:  Negative.    Cardiovascular:  Negative.    Gastrointestinal:  Negative.    Genitourinary:  Negative.    Hematology/Lymphatics:  Negative.    Endocrine:  Negative.    Immunologic:  Negative.    Musculoskeletal:  Negative except as documented in history of present illness.    Integumentary:  Negative.    Neurologic:  Negative.    Psychiatric:  Negative.    All other systems reviewed and negative      Health Status   Allergies:    Allergic Reactions (Selected)  Severity Not Documented  Azithromycin (Constipation and constipation)  Citalopram (Gi distress)  HydrOXYzine (Alters mental staus)  Oxycodone (Alters mental status)  Penicillin (No reactions were documented)  Nonallergic Reactions (Selected)  Moderate  Sertraline (Dizziness)   Medications:  (Selected)   Prescriptions  Prescribed  ALPRAZolam 0.25 mg oral tablet: See Instructions, Instructions: Take one (1) tablet orally (by mouth) twice daily and as needed --newdose, # 60 EA, 0 Refill(s), Type: Soft Stop, Pharmacy: [facility], Take one (1) tablet orally (by mouth) twice daily and as needed --newdose  ASSURA EASICLOSE DRAIN POUCH (07757: ASSURA EASICLOSE DRAIN POUCH (42368, See Instructions, Instructions: as directed, Supply, # 20 unknown unit, 4 Refill(s), Type: Maintenance, Pharmacy: [facility], as directed  ASSURA EASICLOSE DRAIN POUCH 59516: ASSURA EASICLOSE DRAIN POUCH 11046, See Instructions, Instructions: AS DIRECTED, Supply, # 20 EA, 11 Refill(s), Type: Maintenance  Assura Easiclose Drain pouch (58779): Assura Easiclose Drain pouch (29541), See Instructions, Instructions: as directed, Supply, # 1 box(es), 5 Refill(s), Type: Maintenance, Pharmacy: [facility], as directed  Bedside  Kit: Bedside  Kit, See Instructions, Instructions: use for colostomy irrigation, Supply, # 1 EA, 1 Refill(s), Type: Maintenance, Pharmacy: [facility], use for colostomy irrigation  C-pap mask, tubing and supplies: C-pap mask, tubing and supplies, See Instructions, Instructions: Please supply pt with  C-pap machine and  supplies such as mask, tubing, filters from [facility], Supply, # 1 EA, 0 Refill(s), Type: Maintenance  Irrigation Sleeve #7728: Irrigation Sleeve #7728, See Instructions, Instructions: as directed, Supply, # 20 EA, 5 Refill(s), Type: Maintenance, called to pharmacy (Rx)  amLODIPine 5 mg oral tablet: 1 tab(s) ( 5 mg ), po, daily, # 30 tab(s), 5 Refill(s), Type: Maintenance, Pharmacy: [facility]  aspirin 81 mg oral tablet: 1 tab(s) ( 81 mg ), po, daily, # 30 tab(s), 0 Refill(s), Type: Maintenance  furosemide 20 mg oral tablet: 1 tab(s) ( 20 mg ), PO, Daily, PRN: Other (see comment), # 15 tab(s), 0 Refill(s), Type: Maintenance, Pharmacy: [facility], 1 tab(s) po daily,PRN:Other (see comment)  omeprazole 20 mg oral delayed release capsule: = 1 cap(s) ( 20 mg ), Oral, daily, # 90 cap(s), 1 Refill(s), Type: Maintenance, Pharmacy: [facility]  simvastatin 20 mg oral tablet: 1 tab(s) ( 20 mg ), Oral, hs, # 90 tab(s), 0 Refill(s), Type: Maintenance, Pharmacy: [facility]   Problem list:    All Problems (Selected)  Allergic Rhinitis / ICD-9-.9 / Confirmed  Anxiety / SNOMED CT 71357016 / Confirmed  BPH (Benign Prostatic Hypertrophy) / ICD-9-.00 / Confirmed  Cancer of colon / SNOMED CT 302778624 / Confirmed  Colostomy / ICD-9-CM 46.1 / Confirmed  Depression, major, recurrent, moderate / ICD-9-.32 / Confirmed  Edema of both ankles / SNOMED CT 93139040 / Confirmed  KEVIN (generalized anxiety disorder) / SNOMED CT 29976200 / Confirmed  GERD (Gastroesophageal Reflux Disease) / ICD-9-CM  530.81 / Confirmed  Hiatal hernia / SNOMED CT 019360426 / Confirmed  High Cholesterol / ICD-9-.0 / Confirmed  Hypertension / SNOMED CT 0847069892 / Confirmed  Obesity / ICD-9-.00 / Confirmed  LUDMILA (Obstructive Sleep Apnea) / ICD-9-.23 / Confirmed  LUDMILA (obstructive sleep apnea) / SNOMED CT 116245346 / Confirmed  Osteoarthritis of Left Knee / ICD-9-.96 / Confirmed  Overactive bladder / SNOMED CT 222946782 / Confirmed      Histories   Past Medical History:    Active  Cancer of colon (910292534)  Hypertension (1647811717)  BPH (Benign Prostatic Hypertrophy) (600.00)  LUDMILA (Obstructive Sleep Apnea) (327.23)  Overactive bladder (704639598)  Colostomy (46.1)  Allergic Rhinitis (477.9)  Obesity (278.00)  Hiatal hernia (857419725)  High Cholesterol (272.0)  Resolved  *Hospitalized@[facility] - BBPV: Onset on 10/12/2014 at 83 years.  Resolved on 10/15/2014 at 83 years.  *Hospitalized@[facility] - Urinary tract infection: Onset on 2014 at 83 years.  Resolved on 2014 at 83 years.  *Hospitalized@[facility] - Uretheral obstruction & UTI: Onset on 2013 at 82 years.  Resolved on 2013 at 82 years.   Family History:    Cancer  Sister ()  Hypertension  Mother ()     Procedure history:    Transurethral resection of prostate (SNOMED CT 256551124) in  at 83 Years.  Comments:  7/15/2014 11:01 AM - Jay Jay Vasques MD, Dr.  [facility]  Esophagogastroduodenoscopy (SNOMED CT 209452565) performed by Micheal Garza MD on 5/15/2012 at 81 Years.  Comments:  2/15/2013 2:54 PM - Katie Crum RN  Duodenal bx;  active chronic nonspecific duodenitis; no histologic evidence of celiac disease.  Stomach bx;  normal fundic mucosa, no evidence of gastritis, intestinal metaplasia or atrophy.  Colonoscopy (SNOMED CT 695635972) performed by Demetrio Bartlett MD on 3/28/2011 at 80 Years.  Comments:  5/3/2012 10:21 AM - Eleazar Fournier MA  Normal colonoscopy, follow up prn  Colonoscopy (SNOMED CT 903979792)  performed by Demetrio Bartlett MD on 3/14/2006 at 75 Years.  Comments:  4/2/2010 3:48 PM - Khan , Kim  Polypectomy  Blepharoplasty (SNOMED CT 616348277) in 2004 at 73 Years.  Colonoscopy (SNOMED CT 336392277) in the month of 4/2001 at 70 Years.  Arthroscopy of shoulder (SNOMED CT 535925907) in the month of 8/2000 at 69 Years.  Comments:  4/2/2010 3:47 PM - Khan , Kim  Left  Cataract extraction (SNOMED CT 70621320) in the month of 11/1999 at 68 Years.  Comments:  4/2/2010 3:46 PM - Khan , Kim  os  Cataract extraction (SNOMED CT 29424165) in the month of 10/1999 at 68 Years.  Comments:  4/2/2010 3:46 PM - Khan , Kim  OD  Arthroscopy of knee (SNOMED CT 794396548) in 1994 at 63 Years.  Comments:  4/2/2010 3:47 PM - Khan , Kim  Right  partial colectomy with colostomy placement in 1986 at 55 Years.   Social History:        Alcohol Assessment: Past            Past      Tobacco Assessment: Past            Past                     Comments:                      02/21/2013 - Nanci Ramirez                     Used to smoke cigarettes and cigars      Substance Abuse Assessment            Never      Employment and Education Assessment            Retired      Home and Environment Assessment            3 children.            Marital status: .  Lives with Self.  Living situation: Home/Independent.      Nutrition and Health Assessment            Type of diet: Regular.      Exercise and Physical Activity Assessment: Does not exercise        Physical Examination   Vital Signs   9/6/2018 3:27 PM CDT Peripheral Pulse Rate 59 bpm  LOW    Systolic Blood Pressure 158 mmHg  HI    Diastolic Blood Pressure 62 mmHg    Mean Arterial Pressure 94 mmHg    BP Site Right arm    Oxygen Saturation 97 %      Measurements from flowsheet : Measurements   9/6/2018 3:27 PM CDT Height Measured - Standard 69 in    Weight Measured - Standard 197.4 lb    BSA 2.09 m2    Body Mass Index 29.15 kg/m2  HI      General:  Alert and oriented X  3, No acute distress, Warm, Pink, Intact.         Appearance: Within normal limits, Well nourished, Calm.         Hydration: Within normal limits.         Psych: Within normal limits, Appropriate mood and affect, Cooperative, Normal judgment.    Musculoskeletal:       Lower extremity exam: Knee ( Left, Not displaced, No deformity, No erythema, No ecchymosis, No effusion, No swelling, Tenderness, No wound, No crepitus, No numbness, Strength  5 /5, Normal range of motion ).

## 2022-02-15 NOTE — CARE COORDINATION
Patient:   GABBY WATERMAN            MRN: [id number]            FIN: [id number]               Age:   88 years     Sex:  Male     :  1/3/1931   Associated Diagnoses:   None   Author:   Kiera Celeste      Sources of Information:  [ ] Patient, family member, or caregiver (Please list):  [x ] Hospital discharge summary  [ ] Hospital fax  [ ] List of recent hospitalizations or ED visits  [ ] Other:   Last Attending:  Dr. Behzad Garcia  Discharged From: [facility]  Admission Date: 19  Discharge Date: 19  Discharge Plan: To [facility]  Diagnosis/Problem: Urinary retention  Discharge note reviewed: [ x] Yes [ ]No  Medication Changes: [ x] Yes [ ] No   Medication List Updated: [x ] Yes [ ] No  Medications          *denotes recorded medication          ALPRAZolam 0.25 mg oral tablet: See Instructions, Take one (1) tablet orally (by mouth) twice daily and as needed --newdose, 60 EA, 0 Refill(s).          *acetaminophen 325 mg oral tablet: 650 mg, 2 tab(s), Oral, q4 hrs, Per [facility] 19, 0 Refill(s).          amLODIPine 10 mg oral tablet: 10 mg, 1 tab(s), Oral, daily, Per [facility] 2019, 90 tab(s), 0 Refill(s).          aspirin 81 mg oral tablet: 81 mg, 1 tab(s), po, daily, 30 tab(s).          *Tums 500 mg oral tablet, chewable: 500 mg, 1 tab(s), Chewed, daily, Per [facility] 19, 0 Refill(s).          *chondroitin-glucosamine 400 mg-500 mg oral capsule: Per [facility] 19, 0 Refill(s).          *famotidine 20 mg oral tablet: 20 mg, 1 tab(s), Oral, daily, Per [facility] 19, 0 Refill(s).          furosemide 20 mg oral tablet: 20 mg, 1 tab(s), PO, Daily, PRN: Other (see comment), 15 tab(s), 0 Refill(s).          *loratadine 10 mg oral tablet: 10 mg, 1 tab(s), Oral, daily, Per [facility] 19, 0 Refill(s).          *PreserVision oral capsule: 1 cap(s), Oral, bid, Per [facility], 0 Refill(s).          omeprazole 20 mg oral delayed release capsule: 20 mg, 1 cap(s), Oral, daily, 90 cap(s), 1 Refill(s).           polyethylene glycol 3350 oral powder for reconstitution: See Instructions, Mix 17 grams in 8 oz. water, juice, soda, coffee or tea daily, 527 gm, 0 Refill(s).          sertraline 50 mg oral tablet: 50 mg, 1 tab(s), PO, Daily, 30 tab(s), 0 Refill(s).          simvastatin 20 mg oral tablet: 20 mg, 1 tab(s), Oral, hs, 90 tab(s), 0 Refill(s).     Hospital medication list at discharge reconciled with current medication list   Needs Referral or Lab: [ ] Yes [x ] No  Outpatient Provider Recommendations: Follow up on incapacity process, keep huerta in place related to urinary retention, ensure follow up with urology, follow BP with increased amlodipine, readdress code status if daughters become guardians.  F/U Appointment Made With: Dr. Vasques to see pt at [facility] center when rounding  Date:  Patient contacted: No  Additional Information : Pt has appt with Dr. Haywood 1/21/2019

## 2022-02-15 NOTE — PROGRESS NOTES
GABBY WATERMAN  :  1931  MRN:  [id number]  [facility] ASSISTED LIVING VISIT   S: The patient is in the process of switching methods of stool collection regarding his colostomy.  Previously he had irrigated every other day to remove stool.  Now he is letting the stool drop into his colostomy bag and the bag in turn has been emptied a couple of times a day.  He is no longer irrigating.  The patient reports that this is going reasonably well.  He has difficulty with change and I think he needs more time to adjust to this new method.  He reports that sometimes the stool is not quite loose enough.  He is on Metamucil on a daily basis and we discussed the possibility of increasing the dose if that is necessary.  He did not want to increase the dose at this time.     The patient also mentions that he feels weak in his legs the last few days.  He is in physical therapy to help strengthen his legs but does not feel that he is able to participate in physical therapy today.  There is no apparent explanation for this feeling other than the patient has a great deal of anxiety.   O: He appears alert and comfortable.  Skin is normal in color and temperature, nondiaphoretic.  It is clear that the patient is gradually losing memory.  He has a lot of difficulty with word finding.  He has difficulty answering questions and expressing himself.  Heart has a regular rate and rhythm without murmur.  Lungs are clear bilaterally.  No peripheral cyanosis or edema.     A: 1) History of colon cancer with a long-standing colostomy.  His new method of stool collection sounds like it is progressing fine.   2) Mild progressive dementia and memory loss.   3) Severe anxiety.   P: No change in orders at this time.  Continue to monitor the patient s situation and respond to any problems that arise.     D:  2018  T:  2018 Jay Jay Vasques MD/malgorzata    c:  [facility] LIVING

## 2022-02-15 NOTE — TELEPHONE ENCOUNTER
---------------------  From: Renetta Asher CMA   Sent: 1/17/2019 11:52:17 AM CST  Subject: [facility] request     Patient was scheduled to see RAG on 1/21/19 here in [place]. Per provider requests, patients who are nonambulatory are to be referred to the [facility] as they can accommodate STS and [place] transfers. Confirmed patient is non-weight bearing w/ STS needs. Nurse was informed of reschedule request. She understood. Message left for scheduling head to relay request.

## 2022-02-15 NOTE — PROGRESS NOTES
GABBY WATERMANWyatt : 1931  [facility]isted Living Visit MRN: [id number]  10/16/2018  S:  I am following up with the patient regarding his anxiety, arthritis pain, and colostomy function.     The patient is now on sertraline 50 mg daily as well as alprazolam 0.5 mg t.i.d. for his anxiety; this combination seems to be working well and he is reluctant to change anything at this point.     His arthritis pain is fairly well controlled with Celebrex 100 mg daily.     He continues to have concerns about his colostomy function.  He previously irrigated every other evening, more recently his stools seem to be softer and less formed and he has been irrigating once a day.  He is interested in switching colostomy methods to a bag-type that will collect stool and that is emptied two or three times a week so he will not have to irrigate any longer.  We will move forward with a plan to accomplish that over the next couple of weeks.     O: On exam he appears alert and comfortable.  Skin is normal color, temperature, and non-diaphoretic.  Lungs are clear.  Normal respiratory rate and effort.  No peripheral cyanosis.  He walks with a walker, his gait is very slow and he changes positions with obvious discomfort.  He does not appear anxious during the encounter.   P: As above.   D:  10/16/2018  T:  10/17/2018                                                Jay Jay Vasques MD/sq
